# Patient Record
Sex: MALE | URBAN - METROPOLITAN AREA
[De-identification: names, ages, dates, MRNs, and addresses within clinical notes are randomized per-mention and may not be internally consistent; named-entity substitution may affect disease eponyms.]

---

## 2017-11-28 ENCOUNTER — IMPORTED ENCOUNTER (OUTPATIENT)
Dept: URBAN - METROPOLITAN AREA CLINIC 31 | Facility: CLINIC | Age: 82
End: 2017-11-28

## 2017-11-28 PROBLEM — H04.123: Noted: 2017-11-28

## 2017-11-28 PROBLEM — Z96.1: Noted: 2017-11-28

## 2017-11-28 PROCEDURE — 92014 COMPRE OPH EXAM EST PT 1/>: CPT

## 2017-11-28 NOTE — PATIENT DISCUSSION
1.  Pseudophakia OU - IOLs stable. Monitor. 2. Dry Eyes OU:  Start artificials tears. Encouraged regular use. Systane Balance given today. 3. Return for an appointment in 12 months for comprehensive exam. with Dr. Evelin Wong.

## 2018-10-25 NOTE — PROCEDURE NOTE: CLINICAL
PROCEDURE NOTE: Laser for Retinal Tear #1 OD. Diagnosis: Round Retinal Break without Detachment. Anesthesia: Topical. Prior to laser, risks/benefits/alternatives to laser discussed including loss of vision, decreased peripheral and night vision, need for more laser and/or surgery and patient wished to proceed. An informed consent was obtained and no assurances or guarantees were given. Spot size: 100 um. Pulse power: 950 mW. Pulse duration: 200 ms. Number of pulses: 74. Procedure Time: 10:02AM. Patient tolerated procedure well. There were no complications. Post-op instructions given. Patient given office phone number/answering service number and advised to call immediately should there be loss of vision or pain, or should they have any other questions or concerns. Ronnie Garsia

## 2019-06-04 ENCOUNTER — IMPORTED ENCOUNTER (OUTPATIENT)
Dept: URBAN - METROPOLITAN AREA CLINIC 31 | Facility: CLINIC | Age: 84
End: 2019-06-04

## 2019-06-04 PROBLEM — H40.011: Noted: 2019-06-04

## 2019-06-04 PROBLEM — H01.001: Noted: 2019-06-04

## 2019-06-04 PROBLEM — H43.813: Noted: 2019-06-04

## 2019-06-04 PROBLEM — Z96.1: Noted: 2019-06-04

## 2019-06-04 PROBLEM — H01.004: Noted: 2019-06-04

## 2019-06-04 PROBLEM — H01.002: Noted: 2019-06-04

## 2019-06-04 PROBLEM — H01.005: Noted: 2019-06-04

## 2019-06-04 PROBLEM — H40.1111: Noted: 2019-06-04

## 2019-06-04 PROCEDURE — 92015 DETERMINE REFRACTIVE STATE: CPT

## 2019-06-04 PROCEDURE — 92014 COMPRE OPH EXAM EST PT 1/>: CPT

## 2019-06-04 NOTE — PATIENT DISCUSSION
Blepharitis anterior type OU - The patient exhibits reddened crusty eyelid margins. Warm compresses and lid scrubs were recommended. Antibiotic panda to lid margin qhs. Artificial Tears to be used as needed for discomfort.

## 2019-06-04 NOTE — PATIENT DISCUSSION
1.  Primary open angle glaucoma OD mild:  Start Travatan OD QHS. Discussed importance of compliance. Will continue to monitor. RTC 1 mo IOP check. 2.  Pseudophakia OU - IOLs stable. Monitor. 3. Blepharitis anterior type OU - The patient exhibits reddened crusty eyelid margins. Warm compresses and lid scrubs were recommended. Antibiotic panda to lid margin qhs. Artificial Tears to be used as needed for discomfort. 4. PVD OU:  Patient was cautioned to call our office immediately if they experience a substantial change in their symptoms such as an increase in floaters persistent flashes loss of visual field (may appear as a shadow or a curtain) or decrease in visual acuity as these may indicate a retinal tear or detachment. If this is a new problem patient will need to return for re-examination  as determined by the physicianReturn for an appointment in 1 month for pressure check. with Dr. An Ramirez.

## 2019-07-05 ENCOUNTER — IMPORTED ENCOUNTER (OUTPATIENT)
Dept: URBAN - METROPOLITAN AREA CLINIC 31 | Facility: CLINIC | Age: 84
End: 2019-07-05

## 2019-07-05 PROBLEM — H01.001: Noted: 2019-07-05

## 2019-07-05 PROBLEM — H01.002: Noted: 2019-07-05

## 2019-07-05 PROBLEM — Z96.1: Noted: 2019-07-05

## 2019-07-05 PROBLEM — H40.1111: Noted: 2019-07-05

## 2019-07-05 PROBLEM — H43.813: Noted: 2019-07-05

## 2019-07-05 PROBLEM — H01.004: Noted: 2019-07-05

## 2019-07-05 PROBLEM — H01.005: Noted: 2019-07-05

## 2019-07-05 PROCEDURE — 99213 OFFICE O/P EST LOW 20 MIN: CPT

## 2019-07-05 NOTE — PATIENT DISCUSSION
1.  Primary open angle glaucoma OD mild:  Good IOP after staring Tx. Continue  Travatan OD QHS. Discussed importance of compliance. Will continue to monitor. 2.  Pseudophakia OU - IOLs stable. Monitor. Return for an appointment in 6 month for pressure check. with Dr. Ricarda Vasques.

## 2020-01-06 ENCOUNTER — IMPORTED ENCOUNTER (OUTPATIENT)
Dept: URBAN - METROPOLITAN AREA CLINIC 31 | Facility: CLINIC | Age: 85
End: 2020-01-06

## 2020-01-06 PROBLEM — H01.004: Noted: 2020-01-06

## 2020-01-06 PROBLEM — H01.005: Noted: 2020-01-06

## 2020-01-06 PROBLEM — Z96.1: Noted: 2020-01-06

## 2020-01-06 PROBLEM — H01.001: Noted: 2020-01-06

## 2020-01-06 PROBLEM — H43.813: Noted: 2020-01-06

## 2020-01-06 PROBLEM — H01.002: Noted: 2020-01-06

## 2020-01-06 PROBLEM — H40.1111: Noted: 2020-01-06

## 2020-01-06 PROCEDURE — 92015 DETERMINE REFRACTIVE STATE: CPT

## 2020-01-06 PROCEDURE — 99213 OFFICE O/P EST LOW 20 MIN: CPT

## 2020-01-06 NOTE — PATIENT DISCUSSION
1.  Primary open angle glaucoma OD mild:  Good IOP. Continue  Travatan OD QHS. Discussed importance of compliance. Will continue to monitor. 2.  Pseudophakia OU - IOLs stable. Monitor. Return for an appointment in 6 month for pressure check. with Dr. Micheal Brandon.

## 2020-01-16 ENCOUNTER — IMPORTED ENCOUNTER (OUTPATIENT)
Dept: URBAN - METROPOLITAN AREA CLINIC 31 | Facility: CLINIC | Age: 85
End: 2020-01-16

## 2020-01-16 PROBLEM — H01.001: Noted: 2020-01-16

## 2020-01-16 PROBLEM — H01.005: Noted: 2020-01-16

## 2020-01-16 PROBLEM — H43.813: Noted: 2020-01-16

## 2020-01-16 PROBLEM — Z96.1: Noted: 2020-01-16

## 2020-01-16 PROBLEM — H01.004: Noted: 2020-01-16

## 2020-01-16 PROBLEM — H40.1111: Noted: 2020-01-16

## 2020-01-16 PROBLEM — H01.002: Noted: 2020-01-16

## 2020-01-16 PROCEDURE — 99213 OFFICE O/P EST LOW 20 MIN: CPT

## 2020-01-16 NOTE — PATIENT DISCUSSION
1.  Pseudophakia OU - IOLs stable. Monitor. Doctors remake OD lens only today2. Primary open angle glaucoma OD mild:  Good IOP. Continue  Travatan OD QHS. Discussed importance of compliance. Will continue to monitor. Return for an appointment in 6 month for pressure check. with Dr. Lucian Bass.

## 2020-07-07 ENCOUNTER — IMPORTED ENCOUNTER (OUTPATIENT)
Dept: URBAN - METROPOLITAN AREA CLINIC 31 | Facility: CLINIC | Age: 85
End: 2020-07-07

## 2020-07-07 PROBLEM — Z96.1: Noted: 2020-07-07

## 2020-07-07 PROBLEM — H40.1111: Noted: 2020-07-07

## 2020-07-07 PROCEDURE — 99213 OFFICE O/P EST LOW 20 MIN: CPT

## 2020-07-07 PROCEDURE — 99080 SPECIAL REPORTS OR FORMS: CPT

## 2020-07-07 NOTE — PATIENT DISCUSSION
1.  Primary open angle glaucoma OD mild:  IOP excellent tmax 28. Continue with current treatment plan. Discussed importance of compliance. Will continue to monitor for stability or progression. 2. Pseudophakia OU - IOLs stable. Monitor for changes in vision. 3. Return for an appointment in 6 months for comprehensive exam. OCT Nerve. with Dr. Faizan Brooks.

## 2021-01-19 ENCOUNTER — IMPORTED ENCOUNTER (OUTPATIENT)
Dept: URBAN - METROPOLITAN AREA CLINIC 31 | Facility: CLINIC | Age: 86
End: 2021-01-19

## 2021-01-19 PROBLEM — H40.1111: Noted: 2021-01-19

## 2021-01-19 PROBLEM — Z96.1: Noted: 2021-01-19

## 2021-01-19 PROCEDURE — 99214 OFFICE O/P EST MOD 30 MIN: CPT

## 2021-01-19 PROCEDURE — 92133 CPTRZD OPH DX IMG PST SGM ON: CPT

## 2021-01-19 NOTE — PATIENT DISCUSSION
1.  Primary open angle glaucoma OD mild:  OCT stable today tmax 28 IOP at target. Continue with current treatment plan. Discussed importance of compliance. Will continue to monitor for stability or progression. 2. Pseudophakia OU - IOLs stable. Monitor for changes in vision. 3. Return for an appointment in 6 months for pressure check. VF 24-2. with Dr. Camilla Crigler.

## 2021-07-14 ENCOUNTER — IMPORTED ENCOUNTER (OUTPATIENT)
Dept: URBAN - METROPOLITAN AREA CLINIC 31 | Facility: CLINIC | Age: 86
End: 2021-07-14

## 2021-07-14 PROCEDURE — 99213 OFFICE O/P EST LOW 20 MIN: CPT

## 2021-07-14 PROCEDURE — 92083 EXTENDED VISUAL FIELD XM: CPT

## 2022-01-07 ENCOUNTER — IMPORTED ENCOUNTER (OUTPATIENT)
Dept: URBAN - METROPOLITAN AREA CLINIC 31 | Facility: CLINIC | Age: 87
End: 2022-01-07

## 2022-01-07 PROBLEM — H40.1131: Noted: 2022-01-07

## 2022-01-07 PROBLEM — H02.835: Noted: 2022-01-07

## 2022-01-07 PROBLEM — Z96.1: Noted: 2022-01-07

## 2022-01-07 PROBLEM — H02.832: Noted: 2022-01-07

## 2022-01-07 PROBLEM — H43.812: Noted: 2022-01-07

## 2022-01-07 PROBLEM — H40.1111: Noted: 2022-01-07

## 2022-01-07 PROCEDURE — 99214 OFFICE O/P EST MOD 30 MIN: CPT

## 2022-01-07 PROCEDURE — 92133 CPTRZD OPH DX IMG PST SGM ON: CPT

## 2022-01-07 NOTE — PATIENT DISCUSSION
1.  Primary open angle glaucoma OU mild - Continue with current treatment plan. Discussed importance of compliance. Will continue to monitor for stability or progression. 2. Pseudophakia OU - IOLs stable. Monitor for changes in vision. 3. PVD OS: Patient was cautioned to call our office immediately if they experience a substantial change in their symptoms such as an increase in floaters persistent flashes loss of visual field (may appear as a shadow or a curtain) or decrease in visual acuity as these may indicate a retinal tear or detachment. If this is a new problem patient will need to return for re-examination  as determined by the [facility] [place] [id number]. Dermatochalasis OU:  Patient currently asymptomatic. Observe. 5. Return for an appointment in 6 months for pressure check. VF 24-2. with Dr. Evelin Wong.

## 2022-04-02 ASSESSMENT — TONOMETRY
OD_IOP_MMHG: 12
OS_IOP_MMHG: 12
OS_IOP_MMHG: 12
OD_IOP_MMHG: 28
OS_IOP_MMHG: 14
OS_IOP_MMHG: 14
OS_IOP_MMHG: 13
OD_IOP_MMHG: 8
OD_IOP_MMHG: 8
OD_IOP_MMHG: 11
OS_IOP_MMHG: 11
OD_IOP_MMHG: 28
OS_IOP_MMHG: 13
OD_IOP_MMHG: 11
OD_IOP_MMHG: 11
OD_IOP_MMHG: 13
OS_IOP_MMHG: 11
OD_IOP_MMHG: 27
OS_IOP_MMHG: 10
OS_IOP_MMHG: 13

## 2022-04-02 ASSESSMENT — VISUAL ACUITY
OS_SC: 20/25-2
OS_CC: J1+
OD_SC: 20/30
OS_SC: 20/30
OS_SC: 20/30+2
OD_SC: 20/30-1
OD_CC: 20/80
OD_CC: J1+
OS_CC: J1-3
OS_SC: 20/25-1
OD_SC: 20/40+1
OD_SC: 20/20
OD_SC: 20/25-1
OD_SC: 20/30+1
OS_SC: 20/25-1
OS_SC: 20/20-2
OS_CC: J1+-3
OS_CC: 20/60
OS_SC: 20/30-1
OD_PH: CC 20/25 +1
OD_CC: J2-3
OD_SC: 20/25-2
OD_SC: 20/20-1
OS_SC: 20/30+2
OD_SC: 20/25-1
OD_PH: CC 20/30 +2
OS_SC: 20/25

## 2022-07-29 ENCOUNTER — ESTABLISHED PATIENT (OUTPATIENT)
Dept: URBAN - METROPOLITAN AREA CLINIC 29 | Facility: CLINIC | Age: 87
End: 2022-07-29

## 2022-07-29 DIAGNOSIS — H40.1131: ICD-10-CM

## 2022-07-29 DIAGNOSIS — Z96.1: ICD-10-CM

## 2022-07-29 DIAGNOSIS — H43.812: ICD-10-CM

## 2022-07-29 PROCEDURE — 99213 OFFICE O/P EST LOW 20 MIN: CPT

## 2022-07-29 PROCEDURE — 92083 EXTENDED VISUAL FIELD XM: CPT

## 2022-07-29 ASSESSMENT — VISUAL ACUITY
OS_CC: 20/25-1
OD_CC: 20/25-1

## 2022-07-29 ASSESSMENT — TONOMETRY
OD_IOP_MMHG: 12
OS_IOP_MMHG: 11

## 2022-07-29 NOTE — PATIENT DISCUSSION
The IOP is in the target range. The patient will continue the current management.  Pt requests longer f/u since everything has been stable will see once a year.

## 2022-07-30 ENCOUNTER — TELEPHONE ENCOUNTER (OUTPATIENT)
Age: 87
End: 2022-07-30

## 2022-07-31 ENCOUNTER — TELEPHONE ENCOUNTER (OUTPATIENT)
Age: 87
End: 2022-07-31

## 2023-08-22 ENCOUNTER — COMPREHENSIVE EXAM (OUTPATIENT)
Dept: URBAN - METROPOLITAN AREA CLINIC 29 | Facility: CLINIC | Age: 88
End: 2023-08-22

## 2023-08-22 DIAGNOSIS — Z96.1: ICD-10-CM

## 2023-08-22 DIAGNOSIS — H02.835: ICD-10-CM

## 2023-08-22 DIAGNOSIS — H43.812: ICD-10-CM

## 2023-08-22 DIAGNOSIS — H40.1111: ICD-10-CM

## 2023-08-22 DIAGNOSIS — H02.832: ICD-10-CM

## 2023-08-22 PROCEDURE — 92133 CPTRZD OPH DX IMG PST SGM ON: CPT

## 2023-08-22 PROCEDURE — 92014 COMPRE OPH EXAM EST PT 1/>: CPT

## 2023-08-22 ASSESSMENT — TONOMETRY
OS_IOP_MMHG: 11
OD_IOP_MMHG: 11

## 2023-08-22 ASSESSMENT — VISUAL ACUITY
OS_CC: 20/25+2
OD_CC: 20/30

## 2024-08-23 ENCOUNTER — COMPREHENSIVE EXAM (OUTPATIENT)
Dept: URBAN - METROPOLITAN AREA CLINIC 29 | Facility: CLINIC | Age: 89
End: 2024-08-23

## 2024-08-23 DIAGNOSIS — H43.812: ICD-10-CM

## 2024-08-23 DIAGNOSIS — Z96.1: ICD-10-CM

## 2024-08-23 DIAGNOSIS — H02.835: ICD-10-CM

## 2024-08-23 DIAGNOSIS — H40.1111: ICD-10-CM

## 2024-08-23 DIAGNOSIS — H02.832: ICD-10-CM

## 2024-08-23 PROCEDURE — 92083 EXTENDED VISUAL FIELD XM: CPT

## 2024-08-23 PROCEDURE — 92014 COMPRE OPH EXAM EST PT 1/>: CPT

## 2024-08-23 ASSESSMENT — VISUAL ACUITY
OD_CC: 20/20
OS_CC: 20/25

## 2024-08-23 ASSESSMENT — TONOMETRY
OD_IOP_MMHG: 11
OS_IOP_MMHG: 16